# Patient Record
Sex: MALE | Race: WHITE | NOT HISPANIC OR LATINO | Employment: UNEMPLOYED | URBAN - NONMETROPOLITAN AREA
[De-identification: names, ages, dates, MRNs, and addresses within clinical notes are randomized per-mention and may not be internally consistent; named-entity substitution may affect disease eponyms.]

---

## 2020-08-06 ENCOUNTER — APPOINTMENT (OUTPATIENT)
Dept: GENERAL RADIOLOGY | Facility: HOSPITAL | Age: 50
End: 2020-08-06

## 2020-08-06 ENCOUNTER — HOSPITAL ENCOUNTER (EMERGENCY)
Facility: HOSPITAL | Age: 50
Discharge: HOME | End: 2020-08-06
Attending: EMERGENCY MEDICINE | Admitting: EMERGENCY MEDICINE

## 2020-08-06 VITALS
RESPIRATION RATE: 16 BRPM | HEIGHT: 68 IN | SYSTOLIC BLOOD PRESSURE: 173 MMHG | WEIGHT: 202.4 LBS | DIASTOLIC BLOOD PRESSURE: 111 MMHG | OXYGEN SATURATION: 99 % | TEMPERATURE: 97.7 F | BODY MASS INDEX: 30.68 KG/M2 | HEART RATE: 65 BPM

## 2020-08-06 DIAGNOSIS — S20.212A RIB CONTUSION, LEFT, INITIAL ENCOUNTER: Primary | ICD-10-CM

## 2020-08-06 PROCEDURE — 99282 EMERGENCY DEPT VISIT SF MDM: CPT

## 2020-08-06 PROCEDURE — 71101 X-RAY EXAM UNILAT RIBS/CHEST: CPT

## 2020-08-06 RX ORDER — CYCLOBENZAPRINE HCL 10 MG
10 TABLET ORAL 3 TIMES DAILY PRN
Qty: 15 TABLET | Refills: 0 | Status: SHIPPED | OUTPATIENT
Start: 2020-08-06

## 2020-08-06 RX ORDER — TRAMADOL HYDROCHLORIDE 50 MG/1
50 TABLET ORAL EVERY 6 HOURS PRN
Qty: 12 TABLET | Refills: 0 | Status: SHIPPED | OUTPATIENT
Start: 2020-08-06

## 2020-08-06 NOTE — ED PROVIDER NOTES
Subjective   History of Present Illness    Chief Complaint: Left posterior chest pain status post fall  History of Present Illness: 49-year-old male presents with above complaint after falling down steps 4 days ago.  Pain with breathing and movement.  Patient is concerned that he has a rib fracture.  Reports pain with cough  Onset: 4 days  Duration: Persistent  Exacerbating / Alleviating factors: Movement and deep breathing  Associated symptoms: None      Nurses Notes reviewed and agree, including vitals, allergies, social history and prior medical history.     REVIEW OF SYSTEMS: All systems reviewed and not pertinent unless noted.    Positive for: Left posterior chest pain status post fall    Negative for: Fever hemoptysis syncope palpitations  Review of Systems    History reviewed. No pertinent past medical history.    No Known Allergies    History reviewed. No pertinent surgical history.    History reviewed. No pertinent family history.    Social History     Socioeconomic History   • Marital status: Single     Spouse name: Not on file   • Number of children: Not on file   • Years of education: Not on file   • Highest education level: Not on file   Tobacco Use   • Smoking status: Current Every Day Smoker           Objective   Physical Exam  GENERAL APPEARANCE: Well developed, well nourished, 49-year-old white male,  in no acute distress.  VITAL SIGNS: per nursing, reviewed and noted  SKIN: Exposed skin with no rashes, ulcerations or petechiae.    Head: Normocephalic, atraumatic.   EYES:  EOMI.  ENT: Normal voice.  Patient maintained wearing mask throughout patient encounter due to coronavirus pandemic  LUNGS: No increased work of breathing. No retractions.   CARDIOVASCULAR: Good Peripheral pulses. Good capillary refill. Pink and warm extremities.   MUSCULOSKELETAL: No compartment syndrome. Intact sensation tenderness to palpation to the left posterior chest subscapular area.  No deformity no crepitus.  NEUROLOGIC:  Alert, oriented x 3. No gross deficits. GCS 15.   NECK: Supple, symmetric. No tenderness, Full ROM  Psychiatric: normal affect.   Back: full rom, no paraspinal spasm.     Procedures       No attending physician procedures were performed on this patient.    ED Course  ED Course as of Aug 06 1045   Thu Aug 06, 2020   0952 PROCEDURE: XR RIBS LEFT W PA CHEST-     HISTORY: posterior / lateral pain s/p fall.     FINDINGS:     CHEST: Single view of the chest demonstrates a normal heart size and  mediastinum. The lungs are clear. There is no pneumothorax.     RIBS: 3 views of the ribs demonstrate no displaced rib fracture.     IMPRESSION:  No acute process.        This report was finalized on 8/6/2020 9:44 AM by Cori Gale M.D..    [PF]      ED Course User Index  [PF] Kavon Edouard, DO                                           [facility]  49-year-old male with left posterior chest pain status post fall.  No evidence of acute pulmonary findings on chest x-ray per radiologist.  Room air saturations are normal 99%.  Patient does have elevated blood pressure on arrival 198/20.   Will add short course ultram and flexeril. Home with incentive spirometer.   Advised bp checks. Deferred bp medication at this time.   Final diagnoses:   Rib contusion, left, initial encounter            Kavon Edouard DO  08/06/20 1045